# Patient Record
Sex: FEMALE | Race: OTHER | NOT HISPANIC OR LATINO | URBAN - METROPOLITAN AREA
[De-identification: names, ages, dates, MRNs, and addresses within clinical notes are randomized per-mention and may not be internally consistent; named-entity substitution may affect disease eponyms.]

---

## 2018-08-01 ENCOUNTER — OUTPATIENT (OUTPATIENT)
Dept: OUTPATIENT SERVICES | Facility: HOSPITAL | Age: 28
LOS: 1 days | End: 2018-08-01
Payer: MEDICAID

## 2018-08-01 PROCEDURE — G9001: CPT

## 2018-08-28 DIAGNOSIS — Z71.89 OTHER SPECIFIED COUNSELING: ICD-10-CM

## 2021-09-10 ENCOUNTER — EMERGENCY (EMERGENCY)
Facility: HOSPITAL | Age: 31
LOS: 1 days | Discharge: STILL ADMITTED OR ADMINISTRATIVE | End: 2021-09-10
Admitting: EMERGENCY MEDICINE
Payer: MEDICAID

## 2021-09-10 ENCOUNTER — INPATIENT (INPATIENT)
Facility: HOSPITAL | Age: 31
LOS: 0 days | Discharge: HOME | End: 2021-09-11
Attending: SPECIALIST | Admitting: SPECIALIST
Payer: MEDICAID

## 2021-09-10 VITALS
RESPIRATION RATE: 17 BRPM | SYSTOLIC BLOOD PRESSURE: 116 MMHG | HEART RATE: 66 BPM | OXYGEN SATURATION: 95 % | TEMPERATURE: 98 F | DIASTOLIC BLOOD PRESSURE: 60 MMHG

## 2021-09-10 VITALS
RESPIRATION RATE: 18 BRPM | SYSTOLIC BLOOD PRESSURE: 133 MMHG | OXYGEN SATURATION: 97 % | DIASTOLIC BLOOD PRESSURE: 78 MMHG | HEART RATE: 65 BPM

## 2021-09-10 DIAGNOSIS — O14.90 UNSPECIFIED PRE-ECLAMPSIA, UNSPECIFIED TRIMESTER: ICD-10-CM

## 2021-09-10 LAB
ALBUMIN SERPL ELPH-MCNC: 3.8 G/DL — SIGNIFICANT CHANGE UP (ref 3.3–5)
ALP SERPL-CCNC: 158 U/L — HIGH (ref 40–120)
ALT FLD-CCNC: 14 U/L — SIGNIFICANT CHANGE UP (ref 4–33)
ANION GAP SERPL CALC-SCNC: 10 MMOL/L — SIGNIFICANT CHANGE UP (ref 7–14)
APTT BLD: 27.9 SEC — SIGNIFICANT CHANGE UP (ref 27–36.3)
AST SERPL-CCNC: 9 U/L — SIGNIFICANT CHANGE UP (ref 4–32)
BASOPHILS # BLD AUTO: 0.02 K/UL — SIGNIFICANT CHANGE UP (ref 0–0.2)
BASOPHILS NFR BLD AUTO: 0.2 % — SIGNIFICANT CHANGE UP (ref 0–2)
BILIRUB SERPL-MCNC: <0.2 MG/DL — SIGNIFICANT CHANGE UP (ref 0.2–1.2)
BLD GP AB SCN SERPL QL: NEGATIVE — SIGNIFICANT CHANGE UP
BUN SERPL-MCNC: 8 MG/DL — SIGNIFICANT CHANGE UP (ref 7–23)
CALCIUM SERPL-MCNC: 9 MG/DL — SIGNIFICANT CHANGE UP (ref 8.4–10.5)
CHLORIDE SERPL-SCNC: 109 MMOL/L — HIGH (ref 98–107)
CO2 SERPL-SCNC: 22 MMOL/L — SIGNIFICANT CHANGE UP (ref 22–31)
CREAT SERPL-MCNC: 0.63 MG/DL — SIGNIFICANT CHANGE UP (ref 0.5–1.3)
EOSINOPHIL # BLD AUTO: 0.07 K/UL — SIGNIFICANT CHANGE UP (ref 0–0.5)
EOSINOPHIL NFR BLD AUTO: 0.8 % — SIGNIFICANT CHANGE UP (ref 0–6)
FIBRINOGEN PPP-MCNC: 521 MG/DL — HIGH (ref 290–520)
GLUCOSE SERPL-MCNC: 74 MG/DL — SIGNIFICANT CHANGE UP (ref 70–99)
HCT VFR BLD CALC: 31.5 % — LOW (ref 34.5–45)
HGB BLD-MCNC: 9.9 G/DL — LOW (ref 11.5–15.5)
IANC: 6.24 K/UL — SIGNIFICANT CHANGE UP (ref 1.5–8.5)
IMM GRANULOCYTES NFR BLD AUTO: 0.6 % — SIGNIFICANT CHANGE UP (ref 0–1.5)
INR BLD: 1.05 RATIO — SIGNIFICANT CHANGE UP (ref 0.88–1.16)
LDH SERPL L TO P-CCNC: 219 U/L — SIGNIFICANT CHANGE UP (ref 135–225)
LYMPHOCYTES # BLD AUTO: 1.68 K/UL — SIGNIFICANT CHANGE UP (ref 1–3.3)
LYMPHOCYTES # BLD AUTO: 19.7 % — SIGNIFICANT CHANGE UP (ref 13–44)
MCHC RBC-ENTMCNC: 25 PG — LOW (ref 27–34)
MCHC RBC-ENTMCNC: 31.4 GM/DL — LOW (ref 32–36)
MCV RBC AUTO: 79.5 FL — LOW (ref 80–100)
MONOCYTES # BLD AUTO: 0.45 K/UL — SIGNIFICANT CHANGE UP (ref 0–0.9)
MONOCYTES NFR BLD AUTO: 5.3 % — SIGNIFICANT CHANGE UP (ref 2–14)
NEUTROPHILS # BLD AUTO: 6.24 K/UL — SIGNIFICANT CHANGE UP (ref 1.8–7.4)
NEUTROPHILS NFR BLD AUTO: 73.4 % — SIGNIFICANT CHANGE UP (ref 43–77)
NRBC # BLD: 0 /100 WBCS — SIGNIFICANT CHANGE UP
NRBC # FLD: 0 K/UL — SIGNIFICANT CHANGE UP
PLATELET # BLD AUTO: 344 K/UL — SIGNIFICANT CHANGE UP (ref 150–400)
POTASSIUM SERPL-MCNC: 3.9 MMOL/L — SIGNIFICANT CHANGE UP (ref 3.5–5.3)
POTASSIUM SERPL-SCNC: 3.9 MMOL/L — SIGNIFICANT CHANGE UP (ref 3.5–5.3)
PROT SERPL-MCNC: 6.8 G/DL — SIGNIFICANT CHANGE UP (ref 6–8.3)
PROTHROM AB SERPL-ACNC: 12.1 SEC — SIGNIFICANT CHANGE UP (ref 10.6–13.6)
RBC # BLD: 3.96 M/UL — SIGNIFICANT CHANGE UP (ref 3.8–5.2)
RBC # FLD: 18.2 % — HIGH (ref 10.3–14.5)
RH IG SCN BLD-IMP: POSITIVE — SIGNIFICANT CHANGE UP
SODIUM SERPL-SCNC: 141 MMOL/L — SIGNIFICANT CHANGE UP (ref 135–145)
URATE SERPL-MCNC: 5.7 MG/DL — SIGNIFICANT CHANGE UP (ref 2.5–7)
WBC # BLD: 8.51 K/UL — SIGNIFICANT CHANGE UP (ref 3.8–10.5)
WBC # FLD AUTO: 8.51 K/UL — SIGNIFICANT CHANGE UP (ref 3.8–10.5)

## 2021-09-10 PROCEDURE — L9993: CPT

## 2021-09-10 RX ORDER — NIFEDIPINE 30 MG
10 TABLET, EXTENDED RELEASE 24 HR ORAL ONCE
Refills: 0 | Status: COMPLETED | OUTPATIENT
Start: 2021-09-10 | End: 2021-09-10

## 2021-09-10 RX ORDER — MAGNESIUM SULFATE 500 MG/ML
4 VIAL (ML) INJECTION ONCE
Refills: 0 | Status: COMPLETED | OUTPATIENT
Start: 2021-09-10 | End: 2021-09-10

## 2021-09-10 RX ORDER — MAGNESIUM SULFATE 500 MG/ML
2 VIAL (ML) INJECTION
Qty: 40 | Refills: 0 | Status: DISCONTINUED | OUTPATIENT
Start: 2021-09-10 | End: 2021-09-10

## 2021-09-10 RX ORDER — INFLUENZA VIRUS VACCINE 15; 15; 15; 15 UG/.5ML; UG/.5ML; UG/.5ML; UG/.5ML
0.5 SUSPENSION INTRAMUSCULAR ONCE
Refills: 0 | Status: DISCONTINUED | OUTPATIENT
Start: 2021-09-10 | End: 2021-09-11

## 2021-09-10 RX ORDER — METOCLOPRAMIDE HCL 10 MG
5 TABLET ORAL ONCE
Refills: 0 | Status: COMPLETED | OUTPATIENT
Start: 2021-09-10 | End: 2021-09-10

## 2021-09-10 RX ORDER — LABETALOL HCL 100 MG
20 TABLET ORAL ONCE
Refills: 0 | Status: COMPLETED | OUTPATIENT
Start: 2021-09-10 | End: 2021-09-10

## 2021-09-10 RX ORDER — ACETAMINOPHEN 500 MG
975 TABLET ORAL ONCE
Refills: 0 | Status: COMPLETED | OUTPATIENT
Start: 2021-09-10 | End: 2021-09-10

## 2021-09-10 RX ORDER — NIFEDIPINE 30 MG
30 TABLET, EXTENDED RELEASE 24 HR ORAL DAILY
Refills: 0 | Status: DISCONTINUED | OUTPATIENT
Start: 2021-09-10 | End: 2021-09-11

## 2021-09-10 RX ORDER — MAGNESIUM SULFATE 500 MG/ML
4 VIAL (ML) INJECTION ONCE
Refills: 0 | Status: DISCONTINUED | OUTPATIENT
Start: 2021-09-10 | End: 2021-09-10

## 2021-09-10 RX ORDER — ACETAMINOPHEN 500 MG
1000 TABLET ORAL ONCE
Refills: 0 | Status: COMPLETED | OUTPATIENT
Start: 2021-09-10 | End: 2021-09-10

## 2021-09-10 RX ORDER — SODIUM CHLORIDE 9 MG/ML
1000 INJECTION, SOLUTION INTRAVENOUS
Refills: 0 | Status: DISCONTINUED | OUTPATIENT
Start: 2021-09-10 | End: 2021-09-11

## 2021-09-10 RX ORDER — DIPHENHYDRAMINE HCL 50 MG
25 CAPSULE ORAL ONCE
Refills: 0 | Status: COMPLETED | OUTPATIENT
Start: 2021-09-10 | End: 2021-09-10

## 2021-09-10 RX ORDER — MAGNESIUM SULFATE 500 MG/ML
2 VIAL (ML) INJECTION
Qty: 40 | Refills: 0 | Status: DISCONTINUED | OUTPATIENT
Start: 2021-09-10 | End: 2021-09-11

## 2021-09-10 RX ADMIN — Medication 10 MILLIGRAM(S): at 20:38

## 2021-09-10 RX ADMIN — Medication 300 GRAM(S): at 20:35

## 2021-09-10 RX ADMIN — Medication 1000 MILLIGRAM(S): at 22:16

## 2021-09-10 RX ADMIN — Medication 1000 MILLIGRAM(S): at 21:30

## 2021-09-10 RX ADMIN — Medication 25 MILLIGRAM(S): at 22:16

## 2021-09-10 RX ADMIN — Medication 50 GM/HR: at 20:58

## 2021-09-10 RX ADMIN — SODIUM CHLORIDE 50 MILLILITER(S): 9 INJECTION, SOLUTION INTRAVENOUS at 20:37

## 2021-09-10 RX ADMIN — Medication 5 MILLIGRAM(S): at 21:25

## 2021-09-10 RX ADMIN — Medication 20 MILLIGRAM(S): at 20:08

## 2021-09-10 NOTE — H&P ADULT - NSHPLABSRESULTS_GEN_ALL_CORE
9.9    8.51  )-----------( 344      ( 10 Sep 2021 14:27 )             31.5     09-10    141  |  109<H>  |  8   ----------------------------<  74  3.9   |  22  |  0.63    Ca    9.0      10 Sep 2021 14:27    TPro  6.8  /  Alb  3.8  /  TBili  <0.2  /  DBili  x   /  AST  9   /  ALT  14  /  AlkPhos  158<H>  09-10            PT/INR - ( 10 Sep 2021 14:27 )   PT: 12.1 sec;   INR: 1.05 ratio         PTT - ( 10 Sep 2021 14:27 )  PTT:27.9 sec    CAPILLARY BLOOD GLUCOSE        Urine culture     Rapid flu   creatinine    protein   P/c ratio

## 2021-09-10 NOTE — ED ADULT TRIAGE NOTE - CHIEF COMPLAINT QUOTE
PRN duoneb given per request of NP. Breath sounds clear pre/post. PT instructed on IS. Flu swab done. Pt c/o of severe headache s/p vaginal delivery 5 days ago. No relief with medication. Pt had preeclampsia during labor. Denies nausea/vomiting. L&D called, will be transported with ED tech

## 2021-09-10 NOTE — PATIENT PROFILE ADULT - VISION (WITH CORRECTIVE LENSES IF THE PATIENT USUALLY WEARS THEM):
Right lower extremity redness, warmth, and swelling. Normal vision: sees adequately in most situations; can see medication labels, newsprint

## 2021-09-10 NOTE — OB POSTPARTUM TRIAGE NOTE - NSOBPROVIDERNOTE_OBGYN_ALL_OB_FT
30 yo , s/p , PPD#8, presented to D&T with c/o headache.  Patient denies fever, chills, headaches, changes in vision, chest pain, palpitations, shortness of breath, cough, nausea, vomiting, diarrhea, constipation, urinary symptoms, edema.    Prenatal care at [facility].   Prenatal course is significant for gestational HTN.    Patient reports that she had multiple epidural attempts with postpartum headache; blood patch was offered but not done; patient was sent home with Rx for Butalbital.  Patient took Ibuprofen 800 mg at 9 am and Butalbital at 9:30 am without relief.     OB hx: 2021, , complicated by "wet tap"  Med hx: asthma during COVID ()  Surg hx: tonsillectomy a child   GYN hx: denies hx of abnormal Papsmear/cysts/fioids/STDs  Meds: Ibuprofen, Butalbital   Allergies: Penicillin (rash)    Social hx: Denies alcohol, tobacco, drug use  Patient lives with  and baby; breastfeeding     PHYSICAL EXAM  Vital Signs:  HR: 65 (10 Sep 2021 10:58)   BP: 133/78 (10 Sep 2021 10:58)   RR: 18 (10 Sep 2021 10:58) (18 - 18)  SpO2: 97% (10 Sep 2021 10:58) (97% - 97%)    Gen: NAD  Head: NC/AT  Cardio: S1S2+, RRR  Resp: CTABL, no wheezing  Abdomen: Soft, NT/ND, BS+  Extremities: No LE edema bilaterally  lochia is minimal     A:  30 yo , PPD#8 s/o ; headache     Plan: HELLP labs sent; serial BPs, anesthesia called for evaluation.      Chiara DURHAM     09-10-21 @ 12:42 32 yo , s/p , PPD#8, presented to D&T with c/o headache.  Patient denies fever, chills, headaches, changes in vision, chest pain, palpitations, shortness of breath, cough, nausea, vomiting, diarrhea, constipation, urinary symptoms, edema.    Prenatal care at [facility].   Prenatal course is significant for gestational HTN.    Patient reports that she had multiple epidural attempts with postpartum headache; blood patch was offered but not done; patient was sent home with Rx for Butalbital.  Patient took Ibuprofen 800 mg at 9 am and Butalbital at 9:30 am without relief.     OB hx: 2021, , complicated by "wet tap"  Med hx: asthma during COVID ()  Surg hx: tonsillectomy a child   GYN hx: denies hx of abnormal Papsmear/cysts/fioids/STDs  Meds: Ibuprofen, Butalbital   Allergies: Penicillin (rash)    Social hx: Denies alcohol, tobacco, drug use  Patient lives with  and baby; breastfeeding     PHYSICAL EXAM  Vital Signs:  HR: 65 (10 Sep 2021 10:58)   BP: 133/78 (10 Sep 2021 10:58)   RR: 18 (10 Sep 2021 10:58) (18 - 18)  SpO2: 97% (10 Sep 2021 10:58) (97% - 97%)    Gen: NAD  Head: NC/AT  Cardio: S1S2+, RRR  Resp: CTABL, no wheezing  Abdomen: Soft, NT/ND, BS+  Extremities: No LE edema bilaterally  lochia is minimal     A:  32 yo , PPD#8 s/o ; headache     Plan: HELLP labs sent; serial BPs, anesthesia called for evaluation.      Chiara Sanchez CNM     09-10-21 @ 12:42    1500: patient was evaluated by Dr Light, anesthesia.  At this time, blood patch is not recommended, was advised to obtain neurology consult and for head MRI after neuro consult.  Neurology consult was paged; case was reviewed with neuro resident. (page [id number])    Chiara Sanchez CNM 30 yo , s/p , PPD#8, presented to D&T with c/o headache.  Patient denies fever, chills, headaches, changes in vision, chest pain, palpitations, shortness of breath, cough, nausea, vomiting, diarrhea, constipation, urinary symptoms, edema.    Prenatal care at [facility].   Prenatal course is significant for gestational HTN.    Patient reports that she had multiple epidural attempts with postpartum headache; blood patch was offered but not done; patient was sent home with Rx for Butalbital.  Patient took Ibuprofen 800 mg at 9 am and Butalbital at 9:30 am without relief.     OB hx: 2021, , complicated by "wet tap"  Med hx: asthma during COVID ()  Surg hx: tonsillectomy a child   GYN hx: denies hx of abnormal Papsmear/cysts/fioids/STDs  Meds: Ibuprofen, Butalbital   Allergies: Penicillin (rash)    Social hx: Denies alcohol, tobacco, drug use  Patient lives with  and baby; breastfeeding     PHYSICAL EXAM  Vital Signs:  HR: 65 (10 Sep 2021 10:58)   BP: 133/78 (10 Sep 2021 10:58)   RR: 18 (10 Sep 2021 10:58) (18 - 18)  SpO2: 97% (10 Sep 2021 10:58) (97% - 97%)    Gen: NAD  Head: NC/AT  Cardio: S1S2+, RRR  Resp: CTABL, no wheezing  Abdomen: Soft, NT/ND, BS+  Extremities: No LE edema bilaterally  lochia is minimal     A:  30 yo , PPD#8 s/o ; headache     Plan: HELLP labs sent; serial BPs, anesthesia called for evaluation.      Chiara Sanchez CNM     09-10-21 @ 12:42    1500: patient was evaluated by Dr Light, anesthesia.  At this time, blood patch is not recommended, was advised to obtain neurology consult and for head MRI after neuro consult.  Neurology consult was paged; case was reviewed with neuro resident. (page [id number])    Chiara Sanchez CNM    @ 8:18 pm   PEC  wnl  BP: 149-169/  HA 10/10 with movement  Denies Blurry vision or epigastric pain  s/p Neuro consult: See chart not  Needs f/u Anesthesia consult  MRI w & w/o contrast  Discussed with Dr. Pérez

## 2021-09-10 NOTE — H&P ADULT - ASSESSMENT
32 yo , s/p , PPD#8, presented to D&T with c/o headache.  Patient denies fever, chills, headaches, changes in vision, chest pain, palpitations, shortness of breath, cough, nausea, vomiting, diarrhea, constipation, urinary symptoms, edema.    Prenatal care at [facility].   Prenatal course is significant for gestational HTN.    Patient reports that she had multiple epidural attempts with postpartum headache; blood patch was offered but not done; patient was sent home with Rx for Butalbital.  Patient took Ibuprofen 800 mg at 9 am and Butalbital at 9:30 am without relief.     OB hx: 2021, , complicated by "wet tap"  Med hx: asthma during COVID ()  Surg hx: tonsillectomy a child   GYN hx: denies hx of abnormal Papsmear/cysts/fioids/STDs  Meds: Ibuprofen, Butalbital   Allergies: Penicillin (rash)    Social hx: Denies alcohol, tobacco, drug use  Patient lives with  and baby; breastfeeding     PHYSICAL EXAM  Vital Signs:  HR: 65 (10 Sep 2021 10:58)   BP: 133/78 (10 Sep 2021 10:58)   RR: 18 (10 Sep 2021 10:58) (18 - 18)  SpO2: 97% (10 Sep 2021 10:58) (97% - 97%)    Gen: NAD  Head: NC/AT  Cardio: S1S2+, RRR  Resp: CTABL, no wheezing  Abdomen: Soft, NT/ND, BS+  Extremities: No LE edema bilaterally  lochia is minimal     A:  32 yo , PPD#8 s/o ; headache     Plan: HELLP labs sent; serial BPs, anesthesia called for evaluation.      Chiara Sanchez CNM     09-10-21 @ 12:42    1500: patient was evaluated by Dr Light, anesthesia.  At this time, blood patch is not recommended, was advised to obtain neurology consult and for head MRI after neuro consult.  Neurology consult was paged; case was reviewed with neuro resident. (page [id number])    Chiara Sanchez CNM    @ 8:18 pm   PEC  wnl  BP: 149-169/  HA 10/10 with movement  Denies Blurry vision or epigastric pain  s/p Neuro consult: See chart not  Needs f/u Anesthesia consult  MRI w & w/o contrast  Discussed with Dr. Pérez 30 yo , s/p , PPD#8, presented to D&T with c/o headache.  Patient denies fever, chills, headaches, changes in vision, chest pain, palpitations, shortness of breath, cough, nausea, vomiting, diarrhea, constipation, urinary symptoms, edema.    Prenatal care at [facility].   Prenatal course is significant for gestational HTN.    Patient reports that she had multiple epidural attempts with postpartum headache; blood patch was offered but not done; patient was sent home with Rx for Butalbital.  Patient took Ibuprofen 800 mg at 9 am and Butalbital at 9:30 am without relief.     OB hx: 2021, , complicated by "wet tap"  Med hx: asthma during COVID ()  Surg hx: tonsillectomy a child   GYN hx: denies hx of abnormal Papsmear/cysts/fioids/STDs  Meds: Ibuprofen, Butalbital   Allergies: Penicillin (rash)    Social hx: Denies alcohol, tobacco, drug use  Patient lives with  and baby; breastfeeding     PHYSICAL EXAM  Vital Signs:  HR: 65 (10 Sep 2021 10:58)   BP: 133/78 (10 Sep 2021 10:58)   RR: 18 (10 Sep 2021 10:58) (18 - 18)  SpO2: 97% (10 Sep 2021 10:58) (97% - 97%)    Gen: NAD  Head: NC/AT  Cardio: S1S2+, RRR  Resp: CTABL, no wheezing  Abdomen: Soft, NT/ND, BS+  Extremities: No LE edema bilaterally  lochia is minimal     A:  30 yo , PPD#8 s/o ; headache     Plan: HELLP labs sent; serial BPs, anesthesia called for evaluation.      Chiara Sanchez CNM     09-10-21 @ 12:42    1500: patient was evaluated by Dr Light, anesthesia.  At this time, blood patch is not recommended, was advised to obtain neurology consult and for head MRI after neuro consult.  Neurology consult was paged; case was reviewed with neuro resident. (page [id number])    Chiara Sanchez CNM    @ 8:18 pm   PEC  wnl  BP: 149-169/  HA 10/10 with movement  Denies Blurry vision or epigastric pain  s/p Neuro consult: See chart not  Needs f/u Anesthesia consult  MRI w & w/o contrast  Discussed with Dr. Pérez      Pt unknown to me until presentation in the evening of 9/10.  Pt sp IV antihypertensive push for severe range BP's and HA.  Decision made to start magnesium sulfate for seizure ppx.  Pt was also started on procardia 30mg XL.  Appreciate neurology and anesthesia consults.  Pt HA now improved sp meds.  Pt awaiting MRI.    Asuncion Pérez MD

## 2021-09-10 NOTE — PROGRESS NOTE ADULT - SUBJECTIVE AND OBJECTIVE BOX
Called to evaluate this patient with complaint of headache.   She indicates that following delivery ([place]) she began to have this headhache (9/2).  The headache was positional in nature (worse sitting, better lying down).  She was not offered a EBP at that time because it was a difficult placement (many attempts at initial labor epidural).    She arrives today having been discharged some days ago.  When she was discharged she was sent home with pain relievers, including acetaminophen and barbiturates.   She indicates that these help, but that the pain, at times, is unbearable.  Now, however, the  pain is worse on lying down, better sitting up.  On my arrival, she was sitting at ~ 45 degrees.   She says the pain sometimes wakes her at night.    I had a long discussion with the patient regarding her headache.  Given the fact that the pain is now worse on lying flat, the headache may not be due to spinal fluid leakage.   This, in fact, would be inconsistent with that diagnosis.    Recommend, at this time, neurology input and, per their discretion, imaging to determine/rule out other intracranial pathology.  Discussed with OB PA and attending, Dr. Wolf, who agree.    G. Palleschi, MD

## 2021-09-11 ENCOUNTER — TRANSCRIPTION ENCOUNTER (OUTPATIENT)
Age: 31
End: 2021-09-11

## 2021-09-11 VITALS — SYSTOLIC BLOOD PRESSURE: 127 MMHG | DIASTOLIC BLOOD PRESSURE: 70 MMHG | OXYGEN SATURATION: 98 %

## 2021-09-11 LAB
COVID-19 SPIKE DOMAIN AB INTERP: POSITIVE
COVID-19 SPIKE DOMAIN ANTIBODY RESULT: >250 U/ML — HIGH
MAGNESIUM SERPL-MCNC: 3.7 MG/DL — HIGH (ref 1.6–2.6)
MAGNESIUM SERPL-MCNC: 5.2 MG/DL — HIGH (ref 1.6–2.6)
MAGNESIUM SERPL-MCNC: 5.9 MG/DL — HIGH (ref 1.6–2.6)
RH IG SCN BLD-IMP: POSITIVE — SIGNIFICANT CHANGE UP
SARS-COV-2 IGG+IGM SERPL QL IA: >250 U/ML — HIGH
SARS-COV-2 IGG+IGM SERPL QL IA: POSITIVE
SARS-COV-2 RNA SPEC QL NAA+PROBE: SIGNIFICANT CHANGE UP

## 2021-09-11 PROCEDURE — 70546 MR ANGIOGRAPH HEAD W/O&W/DYE: CPT | Mod: 26,59

## 2021-09-11 PROCEDURE — 70553 MRI BRAIN STEM W/O & W/DYE: CPT | Mod: 26

## 2021-09-11 PROCEDURE — 99221 1ST HOSP IP/OBS SF/LOW 40: CPT | Mod: GC

## 2021-09-11 RX ORDER — HEPARIN SODIUM 5000 [USP'U]/ML
5000 INJECTION INTRAVENOUS; SUBCUTANEOUS EVERY 12 HOURS
Refills: 0 | Status: DISCONTINUED | OUTPATIENT
Start: 2021-09-11 | End: 2021-09-11

## 2021-09-11 RX ORDER — ACETAMINOPHEN 500 MG
975 TABLET ORAL EVERY 6 HOURS
Refills: 0 | Status: DISCONTINUED | OUTPATIENT
Start: 2021-09-11 | End: 2021-09-11

## 2021-09-11 RX ORDER — NIFEDIPINE 30 MG
1 TABLET, EXTENDED RELEASE 24 HR ORAL
Qty: 30 | Refills: 0
Start: 2021-09-11 | End: 2021-10-10

## 2021-09-11 RX ORDER — IBUPROFEN 200 MG
1 TABLET ORAL
Qty: 0 | Refills: 0 | DISCHARGE
Start: 2021-09-11

## 2021-09-11 RX ORDER — IBUPROFEN 200 MG
600 TABLET ORAL EVERY 6 HOURS
Refills: 0 | Status: DISCONTINUED | OUTPATIENT
Start: 2021-09-11 | End: 2021-09-11

## 2021-09-11 RX ORDER — ACETAMINOPHEN 500 MG
3 TABLET ORAL
Qty: 0 | Refills: 0 | DISCHARGE
Start: 2021-09-11

## 2021-09-11 RX ORDER — MAGNESIUM OXIDE 400 MG ORAL TABLET 241.3 MG
400 TABLET ORAL EVERY 8 HOURS
Refills: 0 | Status: DISCONTINUED | OUTPATIENT
Start: 2021-09-11 | End: 2021-09-11

## 2021-09-11 RX ORDER — ACETAMINOPHEN 500 MG
975 TABLET ORAL ONCE
Refills: 0 | Status: COMPLETED | OUTPATIENT
Start: 2021-09-11 | End: 2021-09-11

## 2021-09-11 RX ADMIN — Medication 600 MILLIGRAM(S): at 15:00

## 2021-09-11 RX ADMIN — Medication 975 MILLIGRAM(S): at 08:22

## 2021-09-11 RX ADMIN — MAGNESIUM OXIDE 400 MG ORAL TABLET 400 MILLIGRAM(S): 241.3 TABLET ORAL at 15:00

## 2021-09-11 RX ADMIN — HEPARIN SODIUM 5000 UNIT(S): 5000 INJECTION INTRAVENOUS; SUBCUTANEOUS at 05:37

## 2021-09-11 RX ADMIN — Medication 50 GM/HR: at 07:47

## 2021-09-11 RX ADMIN — Medication 30 MILLIGRAM(S): at 05:38

## 2021-09-11 RX ADMIN — Medication 975 MILLIGRAM(S): at 08:42

## 2021-09-11 NOTE — DISCHARGE NOTE NURSING/CASE MANAGEMENT/SOCIAL WORK - PATIENT PORTAL LINK FT
You can access the FollowMyHealth Patient Portal offered by [facility] by registering at the following website: [email or web address]. By joining [facility]’s FollowMyHealth portal, you will also be able to view your health information using other applications (apps) compatible with our system.

## 2021-09-11 NOTE — PROGRESS NOTE ADULT - ASSESSMENT
A/P: 32yo PPD#8 s/p  c/b gHTN, now pp sPEC requiring Mg for seizure prophylaxis with r/o spinal HA vs PRES vs pp sPEC HA. Neuro and anesthesia consulted. Awaiting imaging. Patient is currently stable and doing well post-partum.      #pp sPEC  - Mag x12 for seizure ppx  - c/w Procardia XL 30 QD  - HELLP labs wnl, repeat PRN  - BP and symptom monitoring  - Cardio OB email to be sent upon discharge    #Severe HA  - Neuro c/s (appreciate): recommend IVF, caffeine, MRI and MRV w/ and w/o IV contrast, anesthesia c/s  - Anesthesia huddle performed. Report regardless of MRI evaluation, would not place a blood patch  - Pain medication PRN    #PP  - Pain well controlled, continue current pain regimen  - Continue ambulation, SCDs when not ambulating  - Continue regular diet      Scott Wyatt, PGY-3

## 2021-09-11 NOTE — DISCHARGE NOTE PROVIDER - CARE PROVIDER_API CALL
[facility],   Ambulatory care unit  [facility]  Oncology [facility]  Phone: [phone]  Fax: (   )    -  Follow Up Time: 2 weeks

## 2021-09-11 NOTE — PROGRESS NOTE ADULT - SUBJECTIVE AND OBJECTIVE BOX
OB Progress Note:  PPD#8    S: 30yo PPD#8 s/p  at [facility] c/b gHTN, presenting with elevated BP and HA found to have have pp sPEC. ?Spinal HA, as had "wet tap" and multiple epidural attempts at [place]. Patient fast asleep upon arrival in room. Reports no headache while laying, however increases upon standing. At the moment, well controlled. She is tolerating a regular diet. Denies lightheadedness/dizziness. Denies N/V. Denies HA, SOB, CP, RUQ/epigastric pain, b/l swelling LE and UE, or vision changes.     O:  Vitals:   Vital Signs Last 24 Hrs  T(C): 36.7 (11 Sep 2021 01:00), Max: 36.8 (10 Sep 2021 22:15)  T(F): 98.1 (11 Sep 2021 01:00), Max: 98.2 (10 Sep 2021 22:15)  HR: 103 (11 Sep 2021 04:30) (62 - 103)  BP: 136/78 (11 Sep 2021 04:30) (116/60 - 143/84)  BP(mean): --  RR: 16 (11 Sep 2021 04:30) (16 - 18)  SpO2: 98% (11 Sep 2021 04:30) (95% - 99%)    MEDICATIONS  (STANDING):  heparin   Injectable 5000 Unit(s) SubCutaneous every 12 hours  influenza   Vaccine 0.5 milliLiter(s) IntraMuscular once  lactated ringers. 1000 milliLiter(s) (50 mL/Hr) IV Continuous <Continuous>  magnesium sulfate  IVPB 4 Gram(s) IV Intermittent once  magnesium sulfate Infusion 2 Gm/Hr (50 mL/Hr) IV Continuous <Continuous>  NIFEdipine XL 30 milliGRAM(s) Oral daily    MEDICATIONS  (PRN):      Labs:  Blood type: A Positive  Rubella IgG: RPR:                           9.9<L>   8.51 >-----------< 344    ( 09-10 @ 14:27 )             31.5<L>    09-10-21 @ 14:27      141  |  109<H>  |  8   ----------------------------<  74  3.9   |  22  |  0.63        Ca    9.0      10 Sep 2021 14:27  Mg     5.20<H>         TPro  6.8  /  Alb  3.8  /  TBili  <0.2  /  DBili  x   /  AST  9   /  ALT  14  /  AlkPhos  158<H>  09-10-21 @ 14:27          Physical Exam:  General: NAD  Heart: extremities well-perfused  Lungs: breathing comfortably  Abdomen: soft, non-tender, non-distended, fundus firm  Vaginal: lochia wnl  Extremities: No calf tenderness or erythema

## 2021-09-11 NOTE — DISCHARGE NOTE PROVIDER - NSDCACTIVITY_GEN_ALL_CORE
Showering allowed/Do not make important decisions/Walking - Indoors allowed/No heavy lifting/straining/Walking - Outdoors allowed

## 2021-09-11 NOTE — DISCHARGE NOTE PROVIDER - PROVIDER TOKENS
FREE:[LAST:[[facility]],PHONE:[phone]],FAX:[(   )    -],ADDRESS:[Ambulatory care unit  [facility]  Oncology [facility]],FOLLOWUP:[2 weeks]]

## 2021-09-11 NOTE — CONSULT NOTE ADULT - ASSESSMENT
30 yo , s/p , PPD#8, presented to D&T with complaints of persistent bilateral positional headache on the afternoon of 9/10/21 that has been present since delivery most consistent with postpartum headache likely arising from intracranial hypotension secondary to multiple epidural attempts. Patients neurological exam is reassuring and nonfocal. Treatment with Butalbital has proven unsuccessful. Can rule out causes of secondary headache although suspicions for this are low at this time.      Impression: Postpartum headache likely arising from intracranial hypotension secondary to multiple epidural attempts      Recommendations:    Continue with conservative management: IV Fluids, Caffeine (Coffee), IV magnesium  Obtain MRI Brain w/ and w/o, MRV Brain w/ if amenable with patient and primary team (thought unlikely to be high yield)  Anesthesia consult for candidacy of blood patch      
\"She started complaining of a headache and sore throat and she had a fever last night. She started vomiting this afternoon. \"  Motrin 7.5 ml given @ 1530.
complaints indicating post dural puncture headache.  Blood patch discussed with the patient all risks and benefits as well as alternatives explained  pt agrees to procedure

## 2021-09-11 NOTE — CONSULT NOTE ADULT - SUBJECTIVE AND OBJECTIVE BOX
HPI:    Patient is a 32 yo , s/p , PPD#8, presented to D&T with complaints of persistent bilateral positional headache on the afternoon of 9/10/21 that has been present since delivery. Patient underwent prenatal care at [facility] and reports that she had multiple epidural attempts with insidious onset postpartum headache; blood patch was offered but not done at the time. Patient was sent home with Rx for Butalbital.Patient took Ibuprofen 800 mg at 9 am and Butalbital at 9:30 am without relief. She notes she initially was experiencing mild relief of symptoms with Butalbital however over the past two days she has woken up due to bilateral throbbing pressure-like headache that begins from the occiput and radiates frontotemporally. Patient notes that this headache is overtly positional and she notes a pulling sensation at the occiput when she sits up. Patient denies fever, chills, headaches, changes in vision, nausea, vomiting, numbness, tingling, weakness, or back pain.        ALLERGIES/INTOLERANCES:  Allergies  No Known Allergies    Intolerances    VITALS & EXAMINATION:  Vital Signs Last 24 Hrs  T(C): --  T(F): --  HR: 65 (10 Sep 2021 10:58) (65 - 65)  BP: 133/78 (10 Sep 2021 10:58) (133/78 - 133/78)  BP(mean): --  RR: 18 (10 Sep 2021 10:58) (18 - 18)  SpO2: 97% (10 Sep 2021 10:58) (97% - 97%)    General:  Constitutional: Female, appears stated age, in no apparent distress including pain  Head: Normocephalic & atraumatic.  Extremities: No cyanosis, clubbing, or edema.  Skin: No rashes, bruising, or discoloration.    Neurological (>12):  MS: Awake, alert, oriented to person, place, situation, time. Normal affect. Follows all commands.    Language: Speech is clear, fluent with good repetition & comprehension     CNs: PERRLA (R = 3mm, L = 3mm). VFF. EOMI no nystagmus, no diplopia. V1-3 intact to LT/pinprick, well developed masseter muscles b/l. No facial asymmetry b/l, full eye closure strength b/l. Hearing grossly normal (rubbing fingers) b/l. Symmetric palate elevation in midline. Gag reflex deferred. Head turning & shoulder shrug intact b/l. Tongue midline, normal movements, no atrophy.      Motor: Normal muscle bulk & tone. No noticeable tremor or seizure. No pronator drift.              Deltoid	Biceps	Triceps	Wrist	Finger ABd	   R	5	5	5	5	5		5 	  L	5	5	5	5	5		5    	H-Flex	H-Ext	H-ABd	H-ADd	K-Flex	K-Ext	D-Flex	P-Flex  R	5	5	5	5	5	5	5	5 	   L	5	5	5	5	5	5	5	5	     Sensation: Intact to LT/PP b/l throughout.         Reflexes:              Biceps(C5)       BR(C6)     Triceps(C7)               Patellar(L4)    Achilles(S1)    Plantar Resp  R	2	          2	             2		        2		    2		Down   L	2	          2	             2		        2		    2		Down     Coordination: intact rapid-alt movements. No dysmetria to FTN/HTS      LABORATORY:  CBC                       9.9    8.51  )-----------( 344      ( 10 Sep 2021 14:27 )             31.5     Chem 09-10    141  |  109<H>  |  8   ----------------------------<  74  3.9   |  22  |  0.63    Ca    9.0      10 Sep 2021 14:27    TPro  6.8  /  Alb  3.8  /  TBili  <0.2  /  DBili  x   /  AST  9   /  ALT  14  /  AlkPhos  158<H>  09-10    LFTs LIVER FUNCTIONS - ( 10 Sep 2021 14:27 )  Alb: 3.8 g/dL / Pro: 6.8 g/dL / ALK PHOS: 158 U/L / ALT: 14 U/L / AST: 9 U/L / GGT: x           Coagulopathy PT/INR - ( 10 Sep 2021 14:27 )   PT: 12.1 sec;   INR: 1.05 ratio         PTT - ( 10 Sep 2021 14:27 )  PTT:27.9 sec  
Pt states she developed a postural headache after a multiple attempt epidural placement at [facility] a week ago.  she states the headache resolves when supine and worsens when upright. neck stiffness and photophobia noted.

## 2021-09-11 NOTE — CONSULT NOTE ADULT - ATTENDING COMMENTS
Epidural blood patch preformed    Lumbar region prep and draped   #17 touhy needle till loss of resistance.  20cc blood  from antecubital vein (under sterile conditions) injected to epidural space at L4-5  pt tolerated procedure well. pt placed in supine position.

## 2021-09-11 NOTE — PROGRESS NOTE ADULT - ATTENDING COMMENTS
Patient admitted with HA in the setting of severe PEC.  Now s/p Mgsulfate, BPs normalized, but continues to complain of postural headache.  Symptoms most consistent with post dural puncture HA, but will await MRI results.  For now, conservative measures with IV fluid, caffeine, oral analgesics and PO Magnesium.

## 2021-09-11 NOTE — DISCHARGE NOTE PROVIDER - NSDCMRMEDTOKEN_GEN_ALL_CORE_FT
acetaminophen 325 mg oral tablet: 3 tab(s) orally every 6 hours  ibuprofen 600 mg oral tablet: 1 tab(s) orally every 6 hours  NIFEdipine 30 mg oral tablet, extended release: 1 tab(s) orally once a day

## 2021-09-11 NOTE — DISCHARGE NOTE PROVIDER - HOSPITAL COURSE
32yo  who presented on postpartum day 7 with elevated BPs and a severe headache concerning for sPEC. She was started on Magnesium for seizure ppx, which was discontinued after 12 hours. She was started on Procardia 30XL with excellent BP control. Neuro was consulted for persistent headache; recommended conservative management and MRI to r/o thrombosis. MRI performed on  and showed signs of CSF hypotension c/w post dural puncture headache. Patient was reevaluated by anesthesia on HD2 and had blood patch performed. Patient tolerated procedure well and endorsed complete resolution of headache. She was discharged home in stable condition and will have close follow up with her OB provider and cardio OB.

## 2021-09-16 ENCOUNTER — OUTPATIENT (OUTPATIENT)
Dept: OUTPATIENT SERVICES | Facility: HOSPITAL | Age: 31
LOS: 1 days | End: 2021-09-16

## 2021-09-16 ENCOUNTER — APPOINTMENT (OUTPATIENT)
Dept: OBGYN | Facility: HOSPITAL | Age: 31
End: 2021-09-16

## 2021-09-16 NOTE — HISTORY OF PRESENT ILLNESS
[Postpartum Follow Up] : postpartum follow up [Complications:___] : complications include: [unfilled] [Delivery Date: ___] : on [unfilled] [Female] : Delivery History: baby girl [Wt. ___] : weighing [unfilled] [Rhogam] : Rhogam was not administered [Rubella Vaccine] : Rubella vaccine was not administered [Breastfeeding] : currently nursing [Discharge HCT: ___] : hematocrit level was [unfilled] [Discharge HGB: ___] : hemoglobin level was [unfilled] [Resumed Menses] : has not resumed her menses [Resumed [place]] : has not resumed intercourse [Intended Contraception] : Intended Contraception: [S/Sx PP Depression] : no signs/symptoms of postpartum depression [None] : No associated symptoms are reported [Doing Well] : is doing well [FreeTextEntry8] : Pt was admitted to [facility] 9/10/21 with spinal headache after epidural. Delivered at [facility]. [de-identified] : Considering options [de-identified] : ON Procardia XL 30 mg po once daily. /82.Monitoring BP at home- to report elevations greater than 130/90. Has appt cardio OB 10/16/21. Has appy for follow up with [facility] the beginning of OCT. Discussed contraception options- pt considering. Rx Prenatal vits 1 tab po once daily. Discussed importance of COVID Vaccine- had COVID 11/2020. Will RTC after postpartum visit at [facility]. MHegarty NP

## 2021-09-17 DIAGNOSIS — O14.90 UNSPECIFIED PRE-ECLAMPSIA, UNSPECIFIED TRIMESTER: ICD-10-CM

## 2021-10-08 DIAGNOSIS — O14.90 UNSPECIFIED PRE-ECLAMPSIA, UNSPECIFIED TRIMESTER: ICD-10-CM

## 2021-10-08 DIAGNOSIS — Z82.49 FAMILY HISTORY OF ISCHEMIC HEART DISEASE AND OTHER DISEASES OF THE CIRCULATORY SYSTEM: ICD-10-CM

## 2021-10-08 DIAGNOSIS — Z82.5 FAMILY HISTORY OF ASTHMA AND OTHER CHRONIC LOWER RESPIRATORY DISEASES: ICD-10-CM

## 2021-10-08 DIAGNOSIS — U07.1 COVID-19: ICD-10-CM

## 2021-10-08 DIAGNOSIS — Z82.61 FAMILY HISTORY OF ARTHRITIS: ICD-10-CM

## 2021-10-08 DIAGNOSIS — Z78.9 OTHER SPECIFIED HEALTH STATUS: ICD-10-CM

## 2021-10-08 RX ORDER — NIFEDIPINE 30 MG/1
30 TABLET, EXTENDED RELEASE ORAL
Refills: 0 | Status: ACTIVE | COMMUNITY

## 2021-10-08 RX ORDER — PNV/FERROUS SULFATE/FOLIC ACID 27-<0.5MG
TABLET ORAL DAILY
Refills: 0 | Status: ACTIVE | COMMUNITY

## 2021-10-18 ENCOUNTER — APPOINTMENT (OUTPATIENT)
Dept: CARDIOLOGY | Facility: CLINIC | Age: 31
End: 2021-10-18

## 2022-08-27 ENCOUNTER — OUTPATIENT (OUTPATIENT)
Dept: OUTPATIENT SERVICES | Facility: HOSPITAL | Age: 32
LOS: 1 days | End: 2022-08-27
Payer: MEDICAID

## 2022-08-27 DIAGNOSIS — O26.899 OTHER SPECIFIED PREGNANCY RELATED CONDITIONS, UNSPECIFIED TRIMESTER: ICD-10-CM

## 2022-08-27 DIAGNOSIS — Z3A.00 WEEKS OF GESTATION OF PREGNANCY NOT SPECIFIED: ICD-10-CM

## 2022-08-27 LAB
ALBUMIN SERPL ELPH-MCNC: 2.5 G/DL — LOW (ref 3.5–5)
ALP SERPL-CCNC: 145 U/L — HIGH (ref 40–120)
ALT FLD-CCNC: 11 U/L DA — SIGNIFICANT CHANGE UP (ref 10–60)
ANION GAP SERPL CALC-SCNC: 10 MMOL/L — SIGNIFICANT CHANGE UP (ref 5–17)
APPEARANCE UR: CLEAR — SIGNIFICANT CHANGE UP
APTT BLD: 28 SEC — SIGNIFICANT CHANGE UP (ref 27.5–35.5)
AST SERPL-CCNC: 10 U/L — SIGNIFICANT CHANGE UP (ref 10–40)
BASOPHILS # BLD AUTO: 0.03 K/UL — SIGNIFICANT CHANGE UP (ref 0–0.2)
BASOPHILS NFR BLD AUTO: 0.2 % — SIGNIFICANT CHANGE UP (ref 0–2)
BILIRUB SERPL-MCNC: 0.2 MG/DL — SIGNIFICANT CHANGE UP (ref 0.2–1.2)
BILIRUB UR-MCNC: NEGATIVE — SIGNIFICANT CHANGE UP
BUN SERPL-MCNC: 5 MG/DL — LOW (ref 7–18)
CALCIUM SERPL-MCNC: 8.2 MG/DL — LOW (ref 8.4–10.5)
CHLORIDE SERPL-SCNC: 107 MMOL/L — SIGNIFICANT CHANGE UP (ref 96–108)
CO2 SERPL-SCNC: 20 MMOL/L — LOW (ref 22–31)
COLOR SPEC: YELLOW — SIGNIFICANT CHANGE UP
CREAT ?TM UR-MCNC: 95 MG/DL — SIGNIFICANT CHANGE UP
CREAT SERPL-MCNC: 0.41 MG/DL — LOW (ref 0.5–1.3)
DIFF PNL FLD: NEGATIVE — SIGNIFICANT CHANGE UP
EGFR: 136 ML/MIN/1.73M2 — SIGNIFICANT CHANGE UP
EOSINOPHIL # BLD AUTO: 0.07 K/UL — SIGNIFICANT CHANGE UP (ref 0–0.5)
EOSINOPHIL NFR BLD AUTO: 0.5 % — SIGNIFICANT CHANGE UP (ref 0–6)
FIBRINOGEN PPP-MCNC: 924 MG/DL — HIGH (ref 340–550)
GLUCOSE SERPL-MCNC: 82 MG/DL — SIGNIFICANT CHANGE UP (ref 70–99)
GLUCOSE UR QL: NEGATIVE — SIGNIFICANT CHANGE UP
HCT VFR BLD CALC: 31.9 % — LOW (ref 34.5–45)
HGB BLD-MCNC: 10.1 G/DL — LOW (ref 11.5–15.5)
IMM GRANULOCYTES NFR BLD AUTO: 1 % — SIGNIFICANT CHANGE UP (ref 0–1.5)
INR BLD: 1.07 RATIO — SIGNIFICANT CHANGE UP (ref 0.88–1.16)
KETONES UR-MCNC: NEGATIVE — SIGNIFICANT CHANGE UP
LDH SERPL L TO P-CCNC: 116 U/L — LOW (ref 120–225)
LEUKOCYTE ESTERASE UR-ACNC: ABNORMAL
LYMPHOCYTES # BLD AUTO: 1.89 K/UL — SIGNIFICANT CHANGE UP (ref 1–3.3)
LYMPHOCYTES # BLD AUTO: 13.2 % — SIGNIFICANT CHANGE UP (ref 13–44)
MCHC RBC-ENTMCNC: 26.2 PG — LOW (ref 27–34)
MCHC RBC-ENTMCNC: 31.7 GM/DL — LOW (ref 32–36)
MCV RBC AUTO: 82.6 FL — SIGNIFICANT CHANGE UP (ref 80–100)
MONOCYTES # BLD AUTO: 0.71 K/UL — SIGNIFICANT CHANGE UP (ref 0–0.9)
MONOCYTES NFR BLD AUTO: 5 % — SIGNIFICANT CHANGE UP (ref 2–14)
NEUTROPHILS # BLD AUTO: 11.49 K/UL — HIGH (ref 1.8–7.4)
NEUTROPHILS NFR BLD AUTO: 80.1 % — HIGH (ref 43–77)
NITRITE UR-MCNC: NEGATIVE — SIGNIFICANT CHANGE UP
NRBC # BLD: 0 /100 WBCS — SIGNIFICANT CHANGE UP (ref 0–0)
PH UR: 6.5 — SIGNIFICANT CHANGE UP (ref 5–8)
PLATELET # BLD AUTO: 288 K/UL — SIGNIFICANT CHANGE UP (ref 150–400)
POTASSIUM SERPL-MCNC: 3.5 MMOL/L — SIGNIFICANT CHANGE UP (ref 3.5–5.3)
POTASSIUM SERPL-SCNC: 3.5 MMOL/L — SIGNIFICANT CHANGE UP (ref 3.5–5.3)
PROT ?TM UR-MCNC: 9 MG/DL — SIGNIFICANT CHANGE UP (ref 0–12)
PROT SERPL-MCNC: 6.8 G/DL — SIGNIFICANT CHANGE UP (ref 6–8.3)
PROT UR-MCNC: NEGATIVE — SIGNIFICANT CHANGE UP
PROTHROM AB SERPL-ACNC: 12.8 SEC — SIGNIFICANT CHANGE UP (ref 10.5–13.4)
RBC # BLD: 3.86 M/UL — SIGNIFICANT CHANGE UP (ref 3.8–5.2)
RBC # FLD: 15.3 % — HIGH (ref 10.3–14.5)
SODIUM SERPL-SCNC: 137 MMOL/L — SIGNIFICANT CHANGE UP (ref 135–145)
SP GR SPEC: 1.01 — SIGNIFICANT CHANGE UP (ref 1.01–1.02)
URATE SERPL-MCNC: 3.9 MG/DL — SIGNIFICANT CHANGE UP (ref 2.5–7)
UROBILINOGEN FLD QL: NEGATIVE — SIGNIFICANT CHANGE UP
WBC # BLD: 14.33 K/UL — HIGH (ref 3.8–10.5)
WBC # FLD AUTO: 14.33 K/UL — HIGH (ref 3.8–10.5)

## 2022-08-27 PROCEDURE — 85610 PROTHROMBIN TIME: CPT

## 2022-08-27 PROCEDURE — 76705 ECHO EXAM OF ABDOMEN: CPT | Mod: 26

## 2022-08-27 PROCEDURE — 76705 ECHO EXAM OF ABDOMEN: CPT

## 2022-08-27 PROCEDURE — 99285 EMERGENCY DEPT VISIT HI MDM: CPT

## 2022-08-27 PROCEDURE — 59025 FETAL NON-STRESS TEST: CPT

## 2022-08-27 PROCEDURE — 76815 OB US LIMITED FETUS(S): CPT

## 2022-08-27 PROCEDURE — 83615 LACTATE (LD) (LDH) ENZYME: CPT

## 2022-08-27 PROCEDURE — 81001 URINALYSIS AUTO W/SCOPE: CPT

## 2022-08-27 PROCEDURE — 36415 COLL VENOUS BLD VENIPUNCTURE: CPT | Mod: NC

## 2022-08-27 PROCEDURE — 85025 COMPLETE CBC W/AUTO DIFF WBC: CPT

## 2022-08-27 PROCEDURE — 85384 FIBRINOGEN ACTIVITY: CPT

## 2022-08-27 PROCEDURE — 84156 ASSAY OF PROTEIN URINE: CPT

## 2022-08-27 PROCEDURE — 76817 TRANSVAGINAL US OBSTETRIC: CPT | Mod: 26

## 2022-08-27 PROCEDURE — 76818 FETAL BIOPHYS PROFILE W/NST: CPT

## 2022-08-27 PROCEDURE — 76818 FETAL BIOPHYS PROFILE W/NST: CPT | Mod: 26

## 2022-08-27 PROCEDURE — 84550 ASSAY OF BLOOD/URIC ACID: CPT

## 2022-08-27 PROCEDURE — 76817 TRANSVAGINAL US OBSTETRIC: CPT

## 2022-08-27 PROCEDURE — 82570 ASSAY OF URINE CREATININE: CPT

## 2022-08-27 PROCEDURE — 76815 OB US LIMITED FETUS(S): CPT | Mod: 26

## 2022-08-27 PROCEDURE — 36415 COLL VENOUS BLD VENIPUNCTURE: CPT

## 2022-08-27 PROCEDURE — 85730 THROMBOPLASTIN TIME PARTIAL: CPT

## 2022-08-27 PROCEDURE — 59025 FETAL NON-STRESS TEST: CPT | Mod: 26

## 2022-08-27 PROCEDURE — G0463: CPT

## 2022-08-27 PROCEDURE — 80053 COMPREHEN METABOLIC PANEL: CPT

## 2022-08-27 RX ORDER — ACETAMINOPHEN 500 MG
975 TABLET ORAL ONCE
Refills: 0 | Status: DISCONTINUED | OUTPATIENT
Start: 2022-08-27 | End: 2022-09-11

## 2022-08-27 RX ORDER — SIMETHICONE 80 MG/1
80 TABLET, CHEWABLE ORAL ONCE
Refills: 0 | Status: COMPLETED | OUTPATIENT
Start: 2022-08-27 | End: 2022-08-27

## 2022-08-27 RX ADMIN — SIMETHICONE 80 MILLIGRAM(S): 80 TABLET, CHEWABLE ORAL at 18:40

## 2024-08-26 NOTE — PATIENT PROFILE ADULT - NS PRO AD PATIENT TYPE ON CHART
ELIU Health Care Proxy (HCP)